# Patient Record
Sex: MALE | Race: WHITE | HISPANIC OR LATINO | Employment: UNEMPLOYED | URBAN - METROPOLITAN AREA
[De-identification: names, ages, dates, MRNs, and addresses within clinical notes are randomized per-mention and may not be internally consistent; named-entity substitution may affect disease eponyms.]

---

## 2017-01-25 ENCOUNTER — HOSPITAL ENCOUNTER (EMERGENCY)
Facility: HOSPITAL | Age: 27
Discharge: HOME | End: 2017-01-25
Attending: EMERGENCY MEDICINE

## 2017-01-25 VITALS
SYSTOLIC BLOOD PRESSURE: 116 MMHG | DIASTOLIC BLOOD PRESSURE: 76 MMHG | HEART RATE: 72 BPM | RESPIRATION RATE: 16 BRPM | BODY MASS INDEX: 27.62 KG/M2 | WEIGHT: 176 LBS | OXYGEN SATURATION: 100 % | TEMPERATURE: 98 F | HEIGHT: 67 IN

## 2017-01-25 DIAGNOSIS — R10.9 ABDOMINAL PAIN, UNSPECIFIED LOCATION: Primary | ICD-10-CM

## 2017-01-25 DIAGNOSIS — R10.11 ABDOMINAL PAIN, RIGHT UPPER QUADRANT: ICD-10-CM

## 2017-01-25 LAB
ALBUMIN SERPL BCP-MCNC: 4.1 G/DL
ALP SERPL-CCNC: 83 U/L
ALT SERPL W/O P-5'-P-CCNC: 161 U/L
ANION GAP SERPL CALC-SCNC: 10 MMOL/L
AST SERPL-CCNC: 97 U/L
BASOPHILS # BLD AUTO: 0.04 K/UL
BASOPHILS NFR BLD: 1 %
BILIRUB DIRECT SERPL-MCNC: 0.2 MG/DL
BILIRUB SERPL-MCNC: 0.7 MG/DL
BUN SERPL-MCNC: 14 MG/DL
CALCIUM SERPL-MCNC: 9.4 MG/DL
CHLORIDE SERPL-SCNC: 101 MMOL/L
CO2 SERPL-SCNC: 26 MMOL/L
CREAT SERPL-MCNC: 1 MG/DL
DIFFERENTIAL METHOD: ABNORMAL
EOSINOPHIL # BLD AUTO: 0.3 K/UL
EOSINOPHIL NFR BLD: 7.3 %
ERYTHROCYTE [DISTWIDTH] IN BLOOD BY AUTOMATED COUNT: 12.9 %
EST. GFR  (AFRICAN AMERICAN): >60 ML/MIN/1.73 M^2
EST. GFR  (NON AFRICAN AMERICAN): >60 ML/MIN/1.73 M^2
GLUCOSE SERPL-MCNC: 99 MG/DL
HCT VFR BLD AUTO: 47.2 %
HGB BLD-MCNC: 16.5 G/DL
LYMPHOCYTES # BLD AUTO: 0.9 K/UL
LYMPHOCYTES NFR BLD: 22.1 %
MCH RBC QN AUTO: 30.1 PG
MCHC RBC AUTO-ENTMCNC: 35 %
MCV RBC AUTO: 86 FL
MONOCYTES # BLD AUTO: 1.1 K/UL
MONOCYTES NFR BLD: 26.6 %
NEUTROPHILS # BLD AUTO: 1.7 K/UL
NEUTROPHILS NFR BLD: 42.5 %
PLATELET # BLD AUTO: 228 K/UL
PMV BLD AUTO: 9.8 FL
POTASSIUM SERPL-SCNC: 4 MMOL/L
PROT SERPL-MCNC: 8.1 G/DL
RBC # BLD AUTO: 5.49 M/UL
SODIUM SERPL-SCNC: 137 MMOL/L
WBC # BLD AUTO: 3.99 K/UL

## 2017-01-25 PROCEDURE — 99285 EMERGENCY DEPT VISIT HI MDM: CPT | Mod: ,,, | Performed by: PHYSICIAN ASSISTANT

## 2017-01-25 PROCEDURE — 85025 COMPLETE CBC W/AUTO DIFF WBC: CPT

## 2017-01-25 PROCEDURE — 25500020 PHARM REV CODE 255: Performed by: EMERGENCY MEDICINE

## 2017-01-25 PROCEDURE — 63600175 PHARM REV CODE 636 W HCPCS: Performed by: PHYSICIAN ASSISTANT

## 2017-01-25 PROCEDURE — 99284 EMERGENCY DEPT VISIT MOD MDM: CPT | Mod: 25

## 2017-01-25 PROCEDURE — 80076 HEPATIC FUNCTION PANEL: CPT

## 2017-01-25 PROCEDURE — 80048 BASIC METABOLIC PNL TOTAL CA: CPT

## 2017-01-25 PROCEDURE — 96361 HYDRATE IV INFUSION ADD-ON: CPT

## 2017-01-25 PROCEDURE — 96374 THER/PROPH/DIAG INJ IV PUSH: CPT

## 2017-01-25 PROCEDURE — 25000003 PHARM REV CODE 250: Performed by: PHYSICIAN ASSISTANT

## 2017-01-25 RX ORDER — NAPROXEN 500 MG/1
500 TABLET ORAL 2 TIMES DAILY WITH MEALS
Qty: 14 TABLET | Refills: 0 | Status: SHIPPED | OUTPATIENT
Start: 2017-01-25 | End: 2017-02-01

## 2017-01-25 RX ORDER — KETOROLAC TROMETHAMINE 30 MG/ML
10 INJECTION, SOLUTION INTRAMUSCULAR; INTRAVENOUS
Status: COMPLETED | OUTPATIENT
Start: 2017-01-25 | End: 2017-01-25

## 2017-01-25 RX ADMIN — SODIUM CHLORIDE 1000 ML: 0.9 INJECTION, SOLUTION INTRAVENOUS at 11:01

## 2017-01-25 RX ADMIN — KETOROLAC TROMETHAMINE 10 MG: 30 INJECTION, SOLUTION INTRAMUSCULAR at 10:01

## 2017-01-25 RX ADMIN — IOHEXOL 75 ML: 350 INJECTION, SOLUTION INTRAVENOUS at 11:01

## 2017-01-25 NOTE — ED AVS SNAPSHOT
[facility]  [address] [facility] [place] [address]               Cam Walton   2017  9:42 AM   ED    Descripción:  Male : 1990   Departamento:  [facility]           Perrin Cuidado fue coordinado por:     Provider Role From To    Parish Fall MD Attending Provider [id number] --    CRAIG Woodward Physician Assistant [id number] --      Razón de la giles     Abdominal Pain           Diagnósticos de Esta Visita        Comentarios    Abdominal pain, unspecified location    -  Primario       ED Disposition     ED Disposition Condition Comment    Discharge             Lista de tareas           Información de seguimiento     Realice un seguimiento con:   [facility]    Cómo:  Llamar    Cuándo:  2017    Por qué:  To discuss ER visit and schedule follow up appointment within 1 week    Información de contacto:    [address] [place] [address]  [phone]          Realice un seguimiento con:  [facility] general surgery clinic        Realice un seguimiento con:  [facility]    Cómo:  Aggie faud giles lo antes posible    Especialidad:  Surgery    Información de contacto:    [address] [facility] [id number] [phone]      Recetas para recoger        Disp Refills Start End    naproxen (NAPROSYN) 500 MG tablet 14 tablet 0 2017    Take 1 tablet (500 mg total) by mouth 2 (two) times daily with meals. - Oral      [facility] en Llamada     [facility] En [place]lamada Línea de Enfermeras - Asistencia   Enfermeras registradas de [facility] pueden ayudarle a reservar fuad giles, proveer educación para la mitra, asesoría clínica, y otros servicios de asesoramiento.   Llame para millie servicio gratuito a [phone].             Medicamentos           Mensaje sobre Medicamentos     Verificar los cambios y / o adiciones a perrin régimen de medicación son los mismos que discutir con perrin médico. Si  "cualquiera de estos cambios o adiciones son incorrectos, por favor notifique a burgess proveedor de atención médica.        EMPEZAR a dianna estos medicamentos NUEVOS        Refills    naproxen (NAPROSYN) 500 MG tablet 0    Sig: Take 1 tablet (500 mg total) by mouth 2 (two) times daily with meals.    Categoría: Print    Vía: Oral      These medications were administered today        Dose Freq    ketorolac injection 10 mg 10 mg ED 1 Time    Sig: Inject 10 mg into the vein ED 1 Time.    Categoría: Normal    Vía: Intravenous    Cofirmante de órdenes: Accepted by Parish Fall MD on 1/25/2017 11:31 AM    sodium chloride 0.9% bolus 1,000 mL 1,000 mL ED 1 Time    Sig: Inject 1,000 mLs into the vein ED 1 Time.    Categoría: Normal    Vía: Intravenous    Cofirmante de órdenes: Accepted by Parish Fall MD on 1/25/2017 11:31 AM    omnipaque 350 iohexol 75 mL 75 mL IMG once as needed    Sig: Inject 75 mLs into the vein ONCE PRN for contrast.    Categoría: Normal    Vía: Intravenous           Verifique que la siguiente lista de medicamentos es fuad representación exacta de los medicamentos que está tomando actualmente. Si no hay ningunos reportados, la lista puede estar en cuenca. Si no es correcta, por favor póngase en contacto con burgess proveedor de atención médica. Lleve esta lista con usted en sabrina de emergencia.           Medicamentos Actuales     naproxen (NAPROSYN) 500 MG tablet Take 1 tablet (500 mg total) by mouth 2 (two) times daily with meals.    ondansetron (ZOFRAN-ODT) 4 MG TbDL Take 1 tablet (4 mg total) by mouth every 6 (six) hours as needed.           Información de referencia clínica           Fern signos vitales aiden     PS Pulso Temperatura Resp [place]    116/76 (BP Location: Right arm, Patient Position: Sitting, BP Method: Automatic) 72 98.3 °F (36.8 °C) (Oral) 16 5' 7" (1.702 m) 79.8 kg (176 lb)    SpO2 BMI (IM)                100% 27.57 kg/m2          Yg abarca del:  1/25/2017        No Known " Allergies      Vacunas     Administradas en la fecha de la visita:  1/25/2017        None      ED Micro, Lab, POCT     Start Ordered       Status Ordering Provider    01/25/17 1040 01/25/17 1039  Hepatic function panel  Add-on      Completed     01/25/17 0949 01/25/17 0948  CBC auto differential  STAT      Final result     01/25/17 0949 01/25/17 0948  Basic metabolic panel  STAT      Final result     01/25/17 0949 01/25/17 0949  Hepatic function panel  Once      Final result       ED Imaging Orders     Start Ordered       Status Ordering Provider    01/25/17 1054 01/25/17 1053  CT Abdomen Pelvis With Contrast  1 time imaging      Final result         Instrucciones a shilpa de kirk         Dolor Abdominalcausa Desconocida [Male]  De acuerdo a burgess visita hoy, no es rosetta la causa exacta del dolor abdominal que usted tiene. Burgess condición no parece ser seria en millie momento. Sin embargo, las señales de fuad condición más seria pueden dianna más tiempo en aparecer. Aunque la evaluación fue tranquilizadora maria e, a veces en el curso de muchas enfermedades, exámenes y pruebas de laboratorio pueden parecer normales. Por esta razón, es importante que usted esté pendiente de nuevos síntomas o si burgess condición se empeora.  Causas  : Puede que no sea obvio lo que causó ten síntomas. Preste atención a las cosas que parecen hacer que ten síntomas empeoren o mejoren y consulte con burgess médico para burgess seguimiento.  Diagnóstico  : La evaluación del dolor abdominal en el departamento de emergencia sólo puede requerir un examen por el médico, o puede incluir análisis de darryl, orina o estudios de imagen, dependiendo de muchos factores. A veces, los exámenes y las pruebas pueden identificar fuad causa, mae en muchos casos, no se encuentra fuad causa rosetta. Más pruebas en las visitas de seguimiento pueden ayudar a sugerir un diagnóstico christine.  Cuidado En [place]:  · Descanse hasta que se sienta mejor.  · Trate de evitar cualquier medicamento (a menos  que se lo diga burgess médico), comidas, actividades, u otros factores que pueden ruth contribuido a los síntomas.  · Trate de comer alimentos que usted sabe que simpson tolerado johanna en el pasado. Algunas dietas pueden ser recomendados para algunas condiciones que causan dolor abdominal. Sin embargo, puesto que la causa de ten síntomas pueden no ser more, sobre burgess dieta más con burgess médico de cabecera o especialista para obtener más recomendaciones.  · [place] varias comidas pequeñas al día en lugar de 2 o 3 comidas grandes pueden ayudar.  · Observe con cuidado cualquier cosa que pueda hacer que ten síntomas empeoren o mejores. Preste mucha atención a los siguientes síntomas que pueden indicar empeoramiento de burgess condición.  Seguimiento Y Precauciones:  consulte con burgess doctor o en esta institución según las instrucciones dadas si ten síntomas no mejoran. En algunos casos, es posible que necesite más pruebas.  Comuníquese Con Burgess Médico O Busque Prontamente Atención Médica  si algo de lo siguiente ocurre:  · El dolor aumenta  · No puede dianna ten medicamentos debido al vómito excesivo  · Hinchazón del abdomen.  · Fiebre de 100.4°F (38°C) o más kirk, o saray le haya indicado burgess proveedor de atención médica  · Compa en el vómito o en la materia fecal (de color negruzco o rojizo oscuro).  · Debilidad, mareo o desmayo.  · Dolor en el pecho, brazo, espalda, claude o mandíbula  © 9463-2836 [employer]. [address] [place] [address], [place] [address]. Todos los derechos reservados. Esta información no pretende sustituir la atención médica profesional. Sólo burgess médico puede diagnosticar y tratar un problema de mitra.          Registrarse para [facility]     La activación de burgess cuenta [facility] es tan fácil saray 1-2-3!    1) Ir a [email or web address], seleccione Registrarse Ahora, meter el código de activación y burgess fecha de nacimiento, y seleccione Próximo.    [id number]  Expires: 3/11/2017  1:11 PM      2) Crear un nombre de  usuario y contraseña para usar cuando se visita [facility] en el futuro y selecciona fuad pregunta de seguridad en sabrina de que pierda burgess contraseña y seleccione Próximo.    3) Introduzca burgess dirección de correo electrónico y blaze marcie en Registrarse!    Información Adicional  Si tiene alguna pregunta, por favor, e-mail [email or web address] o llame al [phone] para hablar con nuestro personal. Recuerde, [facility] no debe ser usada para necesidades urgentes. En sabrina de emergencia médica, llame al [phone].         [facility] cumple con las leyes federales aplicables de derechos civiles y no discrimina por motivos de michael, color, origen nacional, edad, discapacidad, o sexo.        Language Assistance Services     ATTENTION: Language assistance services are available, free of charge. Please call [phone].      ATENCIÓN: Si habla español, tiene a burgess disposición servicios gratuitos de asistencia lingüística. Llame al [phone].     CHÚ Ý: N?u b?n nói Ti?ng Vi?t, có các d?ch v? h? tr? ngôn ng? mi?n phí dành cho b?n. G?i s? [phone].                      [facility]  [address] [facility] [address]               Cam Walton   2017  9:42 AM   ED    Description:  Male : 1990   Department:  [facility]           Your Care was Coordinated By:     Provider Role From To    Parish Fall MD Attending Provider 17 [id number] --    CRAIG Woodward Physician Assistant 17 [phone] --      Reason for Visit     Abdominal Pain           Diagnoses this Visit        Comments    Abdominal pain, unspecified location    -  Primary       ED Disposition     ED Disposition Condition Comment    Discharge             To Do List           Follow-up Information     Follow up with [facility]. Call in 1 day.    Why:  To discuss ER visit and schedule follow up appointment within 1 week    Contact information:    [address]  [facility] [address]  [phone]          Follow up with [facility] general surgery clinic.        Schedule an appointment as soon as possible for a visit with [facility].    Specialty:  Surgery    Contact information:    [address]  [facility] [address] [phone]       These Medications        Disp Refills Start End    naproxen (NAPROSYN) 500 MG tablet 14 tablet 0 1/25/2017 2/1/2017    Take 1 tablet (500 mg total) by mouth 2 (two) times daily with meals. - Oral      [facility]     [facility] Nurse Care Line - 24/7 Assistance  Registered nurses in the [facility] provide clinical advisement, health education, appointment booking, and other advisory services.  Call for this free service at [phone].             Medications           Message regarding Medications     Verify the changes and/or additions to your medication regime listed below are the same as discussed with your clinician today.  If any of these changes or additions are incorrect, please notify your healthcare provider.        START taking these NEW medications        Refills    naproxen (NAPROSYN) 500 MG tablet 0    Sig: Take 1 tablet (500 mg total) by mouth 2 (two) times daily with meals.    Class: Print    Route: Oral      These medications were administered today        Dose Freq    ketorolac injection 10 mg 10 mg ED 1 Time    Sig: Inject 10 mg into the vein ED 1 Time.    Class: Normal    Route: Intravenous    Cosign for Ordering: Accepted by Parish Fall MD on 1/25/2017 11:31 AM    sodium chloride 0.9% bolus 1,000 mL 1,000 mL ED 1 Time    Sig: Inject 1,000 mLs into the vein ED 1 Time.    Class: Normal    Route: Intravenous    Cosign for Ordering: Accepted by Parish Fall MD on 1/25/2017 11:31 AM    omnipaque 350 iohexol 75 mL 75 mL IMG once as needed    Sig: Inject 75 mLs into the vein ONCE PRN for contrast.    Class: Normal    Route: Intravenous           Verify  "that the below list of medications is an accurate representation of the medications you are currently taking.  If none reported, the list may be blank. If incorrect, please contact your healthcare provider. Carry this list with you in case of emergency.           Current Medications     naproxen (NAPROSYN) 500 MG tablet Take 1 tablet (500 mg total) by mouth 2 (two) times daily with meals.    ondansetron (ZOFRAN-ODT) 4 MG TbDL Take 1 tablet (4 mg total) by mouth every 6 (six) hours as needed.           Clinical Reference Information           Your Vitals Were     BP Pulse Temp Resp Height Weight    116/76 (BP Location: Right arm, Patient Position: Sitting, BP Method: Automatic) 72 98.3 °F (36.8 °C) (Oral) 16 5' 7" (1.702 m) 79.8 kg (176 lb)    SpO2 BMI                100% 27.57 kg/m2          Allergies as of 1/25/2017     No Known Allergies      Immunizations Administered on Date of Encounter - 1/25/2017     None      ED Micro, Lab, POCT     Start Ordered       Status Ordering Provider    01/25/17 1040 01/25/17 1039  Hepatic function panel  Add-on      Completed     01/25/17 0949 01/25/17 0948  CBC auto differential  STAT      Final result     01/25/17 0949 01/25/17 0948  Basic metabolic panel  STAT      Final result     01/25/17 0949 01/25/17 0949  Hepatic function panel  Once      Final result       ED Imaging Orders     Start Ordered       Status Ordering Provider    01/25/17 1054 01/25/17 1053  CT Abdomen Pelvis With Contrast  1 time imaging      Final result         Discharge Instructions         Dolor Abdominalcausa Desconocida [Male]  De acuerdo a burgess visita hoy, no es rosetta la causa exacta del dolor abdominal que usted tiene. Burgess condición no parece ser seria en millie momento. Sin embargo, las señales de fuad condición más seria pueden dianna más tiempo en aparecer. Aunque la evaluación fue tranquilizadora hoy, a veces en el curso de muchas enfermedades, exámenes y pruebas de laboratorio pueden parecer normales. " Por esta razón, es importante que usted esté pendiente de nuevos síntomas o si burgess condición se empeora.  Causas  : Puede que no sea obvio lo que causó ten síntomas. Preste atención a las cosas que parecen hacer que ten síntomas empeoren o mejoren y consulte con burgess médico para burgess seguimiento.  Diagnóstico  : La evaluación del dolor abdominal en el departamento de emergencia sólo puede requerir un examen por el médico, o puede incluir análisis de darryl, orina o estudios de imagen, dependiendo de muchos factores. A veces, los exámenes y las pruebas pueden identificar fuad causa, mae en muchos casos, no se encuentra fuad causa rosetta. Más pruebas en las visitas de seguimiento pueden ayudar a sugerir un diagnóstico christine.  Cuidado En [place]:  · Descanse hasta que se sienta mejor.  · Trate de evitar cualquier medicamento (a menos que se lo diga burgess médico), comidas, actividades, u otros factores que pueden ruth contribuido a los síntomas.  · Trate de comer alimentos que usted sabe que simpson tolerado johanna en el pasado. Algunas dietas pueden ser recomendados para algunas condiciones que causan dolor abdominal. Sin embargo, puesto que la causa de ten síntomas pueden no ser more, sobre brugess dieta más con burgess médico de cabecera o especialista para obtener más recomendaciones.  · [place] varias comidas pequeñas al día en lugar de 2 o 3 comidas grandes pueden ayudar.  · Observe con cuidado cualquier cosa que pueda hacer que ten síntomas empeoren o mejores. Preste mucha atención a los siguientes síntomas que pueden indicar empeoramiento de burgess condición.  Seguimiento Y Precauciones:  consulte con burgess doctor o en esta institución según las instrucciones dadas si ten síntomas no mejoran. En algunos casos, es posible que necesite más pruebas.  Comuníquese Con Burgess Médico O Busque Prontamente Atención Médica  si algo de lo siguiente ocurre:  · El dolor aumenta  · No puede dianna ten medicamentos debido al vómito excesivo  · Hinchazón del  abdomen.  · Fiebre de 100.4°F (38°C) o más kirk, o saray le haya indicado burgess proveedor de atención médica  · Compa en el vómito o en la materia fecal (de color negruzco o rojizo oscuro).  · Debilidad, mareo o desmayo.  · Dolor en el pecho, brazo, espalda, claude o mandíbula  © 7508-5529 [employer]. [address] [place] [address]. Todos los derechos reservados. Esta información no pretende sustituir la atención médica profesional. Sólo burgess médico puede diagnosticar y tratar un problema de mitra.          [facility] Sign-Up     Activating your [facility] account is as easy as 1-2-3!     1) Visit [email or web address], select Sign Up Now, enter this activation code and your date of birth, then select Next.  [email or web address]  Expires: 3/11/2017  1:11 PM      2) Create a username and password to use when you visit [facility] in the future and select a security question in case you lose your password and select Next.    3) Enter your e-mail address and click Sign Up!    Additional Information  If you have questions, please e-mail [email or web address] or call [phone] to talk to our [facility] staff. Remember, [facility] is NOT to be used for urgent needs. For medical emergencies, dial 911.          [facility] complies with applicable Federal civil rights laws and does not discriminate on the basis of race, color, national origin, age, disability, or sex.        Language Assistance Services     ATTENTION: Language assistance services are available, free of charge. Please call [phone].      ATENCIÓN: Si habla español, tiene a burgess disposición servicios gratuitos de asistencia lingüística. Llame al [phone].     CHÚ Ý: N?u b?n nói Ti?ng Vi?t, có các d?ch v? h? tr? ngôn ng? mi?n phí dành cho b?n. G?i s? [phone].

## 2017-01-25 NOTE — DISCHARGE INSTRUCTIONS
Dolor Abdominalcausa Desconocida [Male]  De acuerdo a burgess visita [place], no es rosetta la causa exacta del dolor abdominal que usted tiene. Burgess condición no parece ser seria en millie momento. Sin embargo, las señales de fuad condición más seria pueden dianna más tiempo en aparecer. Aunque la evaluación fue tranquilizadora maria e, a veces en el curso de muchas enfermedades, exámenes y pruebas de laboratorio pueden parecer normales. Por esta razón, es importante que usted esté pendiente de nuevos síntomas o si burgess condición se empeora.  Causas  : Puede que no sea obvio lo que causó ten síntomas. Preste atención a las cosas que parecen hacer que ten síntomas empeoren o mejoren y consulte con burgess médico para burgess seguimiento.  Diagnóstico  : La evaluación del dolor abdominal en el departamento de emergencia sólo puede requerir un examen por el médico, o puede incluir análisis de darryl, orina o estudios de imagen, dependiendo de muchos factores. A veces, los exámenes y las pruebas pueden identificar fuad causa, mae en muchos casos, no se encuentra fuad causa rosetta. Más pruebas en las visitas de seguimiento pueden ayudar a sugerir un diagnóstico christine.  Cuidado En [place]:  · Descanse hasta que se sienta mejor.  · Trate de evitar cualquier medicamento (a menos que se lo diga burgess médico), comidas, actividades, u otros factores que pueden ruth contribuido a los síntomas.  · Trate de comer alimentos que usted sabe que simpson tolerado johanna en el pasado. Algunas dietas pueden ser recomendados para algunas condiciones que causan dolor abdominal. Sin embargo, puesto que la causa de ten síntomas pueden no ser more, sobre burgess dieta más con burgess médico de cabecera o especialista para obtener más recomendaciones.  · [place] varias comidas pequeñas al día en lugar de 2 o 3 comidas grandes pueden ayudar.  · Observe con cuidado cualquier cosa que pueda hacer que ten síntomas empeoren o mejores. Preste mucha atención a los siguientes síntomas que pueden indicar  empeoramiento de burgess condición.  Seguimiento Y Precauciones:  consulte con burgess doctor o en esta institución según las instrucciones dadas si ten síntomas no mejoran. En algunos casos, es posible que necesite más pruebas.  Comuníquese Con Burgess Médico O Busque Prontamente Atención Médica  si algo de lo siguiente ocurre:  · El dolor aumenta  · No puede dianna ten medicamentos debido al vómito excesivo  · Hinchazón del abdomen.  · Fiebre de 100.4°F (38°C) o más kirk, o saray le haya indicado burgess proveedor de atención médica  · Compa en el vómito o en la materia fecal (de color negruzco o rojizo oscuro).  · Debilidad, mareo o desmayo.  · Dolor en el pecho, pedro, madi, claude o bette  © 2739-4592 [employer]. [address] [place] [address], [place] [address]. Todos los derechos reservados. Esta información no pretende sustituir la atención médica profesional. Sólo burgess médico puede diagnosticar y tratar un problema de mitra.

## 2017-01-25 NOTE — ED NOTES
Patient came in with a c/o RUQ pain, patient states that the pain originally started in the RLQ on Sunday. Patient states that the pain is radiating upwards.

## 2017-01-25 NOTE — ED NOTES
Patient identifiers verified and correct for Cam Walton.    LOC: The patient is awake, alert and aware of environment with an appropriate affect, the patient is oriented x 3 and speaking appropriately.  APPEARANCE: Patient resting comfortably and in no acute distress, patient is clean and well groomed, patient's clothing is properly fastened.  SKIN: The skin is warm and dry, color consistent with ethnicity, patient has normal skin turgor and moist mucus membranes, skin intact, no breakdown or bruising noted.  MUSCULOSKELETAL: Patient moving all extremities spontaneously, no obvious swelling or deformities noted.  RESPIRATORY: Airway is open and patent, respirations are spontaneous, patient has a normal effort and rate, no accessory muscle use noted, clear bilateral breath sounds noted through out the chest.  ABDOMEN: Soft and  tender to palpation (RUQ), no distention noted, normoactive bowel sounds present in all four quadrants.

## 2017-01-25 NOTE — ED PROVIDER NOTES
Encounter Date: 1/25/2017    SCRIBE #1 NOTE: I, Regla Egan, am scribing for, and in the presence of,  Dr. Fall. I have scribed the following portions of the note - the APC attestation.       History     Chief Complaint   Patient presents with    Abdominal Pain     Pt c/o RLQ abdominal pain. Pt denies nausea and vomiting. Pt c/o low grade fever. Pt states in 2015 he had fluid drained from his appendix.      Review of patient's allergies indicates:  No Known Allergies  HPI Comments: 26-year-old male presents to the ER with chief complaint of right sided abdominal pain ×4 days.  The patient has a history of acute appendicitis with perforation and abscess in 12/2015.   He had an IR drain placed and was hospitalized and given IV antibiotics at that time.  The patient returned to our ER November 2016 with a second episode of abdominal pain.  The patient was found to have an early appendicitis on CT, he was treated with Cipro and Flagyl and discharged home.    The patient's pain returned in the right lower quadrant 4 days ago.  He says his pain is now in the right upper quadrant today.  He describes intermittent burning pain.  His pain was severe prior to arrival but is currently rated 4/10.  He has not taken any medications for pain.  He denies known alleviating or exacerbating factors.  He reports subjective fever with body aches when his pain began 4 days ago.  He denies nausea, vomiting, diarrhea, or additional complaints at this time.    Past Medical History   Diagnosis Date    Acute appendicitis with perforation and peritoneal abscess 2015     IR drain, abx     Past Medical History Pertinent Negatives   Diagnosis Date Noted    Diabetes mellitus 11/29/2016    Hypertension 11/29/2016     No past surgical history on file.  History reviewed. No pertinent family history.  Social History   Substance Use Topics    Smoking status: Never Smoker    Smokeless tobacco: None    Alcohol use Yes     Review of Systems    Constitutional: Positive for fever (subjective). Negative for chills.   HENT: Negative for sore throat.    Respiratory: Negative for shortness of breath.    Cardiovascular: Negative for chest pain.   Gastrointestinal: Negative for abdominal pain, nausea and vomiting.   Genitourinary: Negative for dysuria.   Musculoskeletal: Positive for myalgias. Negative for back pain and neck pain.   Skin: Negative for rash.   Neurological: Negative for dizziness, syncope, weakness and light-headedness.   Hematological: Does not bruise/bleed easily.   Psychiatric/Behavioral: Negative for confusion.       Physical Exam   Initial Vitals   BP Pulse Resp Temp SpO2   01/25/17 0930 01/25/17 0930 01/25/17 0930 01/25/17 0930 01/25/17 1209   137/77 82 16 98.4 °F (36.9 °C) 95 %     Physical Exam    Constitutional: He appears well-developed and well-nourished.   HENT:   Head: Atraumatic.   Mouth/Throat: Oropharynx is clear and moist.   Eyes: Conjunctivae and EOM are normal. Pupils are equal, round, and reactive to light.   Neck: Normal range of motion. Neck supple.   Cardiovascular: Normal rate and regular rhythm.   Pulmonary/Chest: Breath sounds normal. No respiratory distress. He has no wheezes. He has no rhonchi. He has no rales.   Abdominal: Soft. Bowel sounds are normal. He exhibits no distension. There is tenderness in the right upper quadrant. There is no rebound and no guarding.   Neurological: He is alert and oriented to person, place, and time.   Skin: No rash noted.   Psychiatric: He has a normal mood and affect.         ED Course   Procedures  Labs Reviewed   CBC W/ AUTO DIFFERENTIAL - Abnormal; Notable for the following:        Result Value    Gran # 1.7 (*)     Lymph # 0.9 (*)     Mono # 1.1 (*)     Mono% 26.6 (*)     All other components within normal limits   HEPATIC FUNCTION PANEL - Abnormal; Notable for the following:     AST 97 (*)      (*)     All other components within normal limits    Narrative:     add on per  dr. darrius kirk md; order number [id number] ([id number])   11:21  01/25/2017    BASIC METABOLIC PANEL   HEPATIC FUNCTION PANEL             Medical Decision Making:   History:   Old Medical Records: I decided to obtain old medical records.  Independently Interpreted Test(s):   I have ordered and independently interpreted X-rays - see prior notes.  Clinical Tests:   Lab Tests: Ordered and Reviewed  Radiological Study: Ordered and Reviewed       APC / Resident Notes:   Patient presents to the ER for evaluation of right-sided abdominal pain.  His pain initially began in the RLQ but is in the right upper quadrant today.  He has associated subjective fevers and a history of perforated appendicitis.  Last episode of appendicitis was November 2016 treated with antibiotics.  The patient did follow-up in surgery clinic and was told that they would not require appendectomy if he did not have another episode of appendicitis in 5 years.      I will perform labs and CT of the abdomen, give Toradol for pain control, and reassess.  Patient feels improved on reassessment.  He has slight tenderness in the right upper quadrant.  Patient has slight elevation of LFTs consistent with previous labs.  No obstructive pattern and his presentation is not concerning for acute cholecystitis.  He has no leukocytosis, signs of significant dehydration or electrolyte abnormalities.     CT shows enlarged fatty liver, with no other acute abnormalities.  They note a previous appendicolith has been resected and there is no inflammatory process.    The cause of the patients pain is not clear today, but we feel he is stable for discharge.      Patient is informed of enlarged fatty liver and elevation of LFTs.  He is instructed on close follow-up with a primary care physician as well as general surgery clinic.      I prescribed naproxen as needed for pain control.  He is given strict return precautions.I discussed the care of this patient with my  supervising MD. Ya Attestation:   Grahamibe #1: I performed the above scribed service and the documentation accurately describes the services I performed. I attest to the accuracy of the note.    Attending Attestation:     Physician Attestation Statement for NP/PA:   I discussed this assessment and plan of this patient with the NP/PA, but I did not personally examine the patient. The face to face encounter was performed by the NP/PA.        Physician Attestation for Scribe:  Physician Attestation Statement for Scribe #1: I, Dr. Fall, reviewed documentation, as scribed by Regla Egan in my presence, and it is both accurate and complete.                 ED Course     Clinical Impression:   The encounter diagnosis was Abdominal pain, unspecified location.          CRAIG Woodward  01/25/17 2492

## 2018-12-11 ENCOUNTER — HOSPITAL ENCOUNTER (EMERGENCY)
Facility: HOSPITAL | Age: 28
Discharge: HOME | End: 2018-12-11
Attending: EMERGENCY MEDICINE

## 2018-12-11 VITALS
WEIGHT: 184 LBS | RESPIRATION RATE: 16 BRPM | HEART RATE: 80 BPM | TEMPERATURE: 98 F | SYSTOLIC BLOOD PRESSURE: 126 MMHG | DIASTOLIC BLOOD PRESSURE: 80 MMHG | BODY MASS INDEX: 27.89 KG/M2 | HEIGHT: 68 IN | OXYGEN SATURATION: 97 %

## 2018-12-11 DIAGNOSIS — K59.00 CONSTIPATION, UNSPECIFIED CONSTIPATION TYPE: ICD-10-CM

## 2018-12-11 DIAGNOSIS — R10.31 RLQ ABDOMINAL PAIN: Primary | ICD-10-CM

## 2018-12-11 LAB
ALBUMIN SERPL BCP-MCNC: 4.2 G/DL
ALP SERPL-CCNC: 99 U/L
ALT SERPL W/O P-5'-P-CCNC: 140 U/L
AMORPH CRY URNS QL MICRO: NORMAL
ANION GAP SERPL CALC-SCNC: 9 MMOL/L
AST SERPL-CCNC: 90 U/L
BASOPHILS # BLD AUTO: 0.09 K/UL
BASOPHILS NFR BLD: 1.5 %
BILIRUB SERPL-MCNC: 0.4 MG/DL
BILIRUB UR QL STRIP: NEGATIVE
BUN SERPL-MCNC: 14 MG/DL
CALCIUM SERPL-MCNC: 9.4 MG/DL
CHLORIDE SERPL-SCNC: 107 MMOL/L
CLARITY UR: ABNORMAL
CO2 SERPL-SCNC: 26 MMOL/L
COLOR UR: YELLOW
CREAT SERPL-MCNC: 1.1 MG/DL
DIFFERENTIAL METHOD: ABNORMAL
EOSINOPHIL # BLD AUTO: 1 K/UL
EOSINOPHIL NFR BLD: 16.6 %
ERYTHROCYTE [DISTWIDTH] IN BLOOD BY AUTOMATED COUNT: 12.7 %
EST. GFR  (AFRICAN AMERICAN): >60 ML/MIN/1.73 M^2
EST. GFR  (NON AFRICAN AMERICAN): >60 ML/MIN/1.73 M^2
GLUCOSE SERPL-MCNC: 106 MG/DL
GLUCOSE UR QL STRIP: NEGATIVE
HCT VFR BLD AUTO: 43.8 %
HGB BLD-MCNC: 15.6 G/DL
HGB UR QL STRIP: NEGATIVE
KETONES UR QL STRIP: NEGATIVE
LEUKOCYTE ESTERASE UR QL STRIP: NEGATIVE
LIPASE SERPL-CCNC: 23 U/L
LYMPHOCYTES # BLD AUTO: 2.4 K/UL
LYMPHOCYTES NFR BLD: 39.5 %
MCH RBC QN AUTO: 30.6 PG
MCHC RBC AUTO-ENTMCNC: 35.6 G/DL
MCV RBC AUTO: 86 FL
MICROSCOPIC COMMENT: NORMAL
MONOCYTES # BLD AUTO: 0.5 K/UL
MONOCYTES NFR BLD: 7.8 %
NEUTROPHILS # BLD AUTO: 2.1 K/UL
NEUTROPHILS NFR BLD: 34.6 %
NITRITE UR QL STRIP: NEGATIVE
PH UR STRIP: 8 [PH] (ref 5–8)
PLATELET # BLD AUTO: 219 K/UL
PMV BLD AUTO: 10.2 FL
POTASSIUM SERPL-SCNC: 3.2 MMOL/L
PROT SERPL-MCNC: 7.6 G/DL
PROT UR QL STRIP: NEGATIVE
RBC # BLD AUTO: 5.09 M/UL
RBC #/AREA URNS HPF: 0 /HPF (ref 0–4)
SODIUM SERPL-SCNC: 142 MMOL/L
SP GR UR STRIP: 1.01 (ref 1–1.03)
SQUAMOUS #/AREA URNS HPF: NORMAL /HPF
URN SPEC COLLECT METH UR: ABNORMAL
UROBILINOGEN UR STRIP-ACNC: NEGATIVE EU/DL
WBC # BLD AUTO: 6.03 K/UL
WBC #/AREA URNS HPF: 0 /HPF (ref 0–5)

## 2018-12-11 PROCEDURE — 81000 URINALYSIS NONAUTO W/SCOPE: CPT

## 2018-12-11 PROCEDURE — 25500020 PHARM REV CODE 255: Performed by: EMERGENCY MEDICINE

## 2018-12-11 PROCEDURE — 85025 COMPLETE CBC W/AUTO DIFF WBC: CPT

## 2018-12-11 PROCEDURE — 87491 CHLMYD TRACH DNA AMP PROBE: CPT

## 2018-12-11 PROCEDURE — 83690 ASSAY OF LIPASE: CPT

## 2018-12-11 PROCEDURE — 80053 COMPREHEN METABOLIC PANEL: CPT

## 2018-12-11 PROCEDURE — 25000003 PHARM REV CODE 250: Performed by: NURSE PRACTITIONER

## 2018-12-11 PROCEDURE — 99285 EMERGENCY DEPT VISIT HI MDM: CPT | Mod: 25

## 2018-12-11 RX ORDER — POLYETHYLENE GLYCOL 3350 17 G/17G
17 POWDER, FOR SOLUTION ORAL DAILY
Refills: 0 | COMMUNITY
Start: 2018-12-11

## 2018-12-11 RX ORDER — SYRING-NEEDL,DISP,INSUL,0.3 ML 29 G X1/2"
296 SYRINGE, EMPTY DISPOSABLE MISCELLANEOUS ONCE
Refills: 0 | COMMUNITY
Start: 2018-12-11 | End: 2018-12-11

## 2018-12-11 RX ADMIN — IOHEXOL 75 ML: 350 INJECTION, SOLUTION INTRAVENOUS at 10:12

## 2018-12-11 RX ADMIN — SODIUM CHLORIDE 1000 ML: 0.9 INJECTION, SOLUTION INTRAVENOUS at 09:12

## 2018-12-12 NOTE — ED PROVIDER NOTES
Encounter Date: 12/11/2018     This is a 28 y.o. male complaining of subjective fever, vomiting, abdominal pain. Also reports generalized body aches. Denies cough.    I have evaluated and conducted a medical screening exam with initial orders entered, if indicated, to expedite care. The patient will be placed in a treatment area when one is available. Care will be transferred to an alternate provider for a full assessment including but not limited to: history, physical exam, additional orders, and final disposition.    Dominick Carrillo NP      SCRIBE #1 NOTE: IMarilu am scribing for, and in the presence of,  EVITA Montemayor. I have scribed the following portions of the note - Other sections scribed: HPI and ROS.       History     Chief Complaint   Patient presents with    Generalized Body Aches     Pt reports body aches all over and yesterday he vomited a couple times.     CC: Abdominal Pain    28 y.o. Male, with a medical history of acute appendicitis with perforation and peritoneal abscess (2015), presents to the ED c/o acute right lower quadrant abdominal pain for the past 2x days. Pt reports experiencing a cramping sensation to the RLQ, noting that pain is intermittent and severe (8/10). He adds that he has also been experiencing generalized body aches and a subjective fever (began yesterday; resolved today). Pt notes that he has been evaluated at this ED for appendix issues in the past. He additionally c/o an itchy penile rash that began 1x month ago. Pt denies penile discharge and dysuria. No other associated symptoms. No alleviating factors.      The history is provided by the patient. A  was used (Nurse translating for pt).     Review of patient's allergies indicates:  No Known Allergies  Past Medical History:   Diagnosis Date    Acute appendicitis with perforation and peritoneal abscess 2015    IR drain, abx     History reviewed. No pertinent surgical history.  History  reviewed. No pertinent family history.  Social History     Tobacco Use    Smoking status: Never Smoker    Smokeless tobacco: Never Used   Substance Use Topics    Alcohol use: Yes     Comment: socially    Drug use: No     Review of Systems   Constitutional: Positive for fever (subjective). Negative for appetite change and chills.   HENT: Negative for sore throat.    Respiratory: Negative for shortness of breath.    Cardiovascular: Negative for chest pain.   Gastrointestinal: Positive for abdominal pain (cramping; RLQ). Negative for constipation (Last bowel movement today at 5pm), nausea and vomiting.   Genitourinary: Negative for discharge and dysuria.   Musculoskeletal: Positive for myalgias (generalized). Negative for back pain.   Skin: Positive for rash (to the penis).   Neurological: Negative for weakness.   All other systems reviewed and are negative.    Physical Exam     Initial Vitals [12/11/18 2035]   BP Pulse Resp Temp SpO2   128/77 73 18 98.2 °F (36.8 °C) 98 %      MAP       --         Physical Exam    Nursing note and vitals reviewed.  Constitutional: He appears well-developed and well-nourished.   HENT:   Head: Normocephalic and atraumatic.   Eyes: Conjunctivae and EOM are normal. Pupils are equal, round, and reactive to light.   Cardiovascular: Normal rate, regular rhythm, normal heart sounds and intact distal pulses. Exam reveals no gallop and no friction rub.    No murmur heard.  Pulmonary/Chest: Breath sounds normal. No respiratory distress. He has no wheezes. He has no rhonchi. He has no rales. He exhibits no tenderness.   Abdominal: Soft. Normal appearance and bowel sounds are normal. He exhibits no distension and no mass. There is tenderness in the right lower quadrant. There is no rigidity, no rebound, no guarding, no CVA tenderness, no tenderness at McBurney's point and negative Minor's sign.   Musculoskeletal: Normal range of motion. He exhibits no edema or tenderness.   Neurological: He is  alert and oriented to person, place, and time. He has normal strength. GCS score is 15. GCS eye subscore is 4. GCS verbal subscore is 5. GCS motor subscore is 6.   Skin: No rash and no abscess noted. No erythema.   Multiple skin lesions noted to shaft of penis consistent with condyloma- no rash or wound noted        ED Course   Procedures  Labs Reviewed   CBC W/ AUTO DIFFERENTIAL - Abnormal; Notable for the following components:       Result Value    Eos # 1.0 (*)     Gran% 34.6 (*)     Eosinophil% 16.6 (*)     All other components within normal limits   C. TRACHOMATIS/N. GONORRHOEAE BY AMP DNA   COMPREHENSIVE METABOLIC PANEL   LIPASE   URINALYSIS, REFLEX TO URINE CULTURE   URINALYSIS MICROSCOPIC          Imaging Results          CT Abdomen Pelvis With Contrast (In process)                  Medical Decision Making:   History:   Old Medical Records: I decided to obtain old medical records.  Old Records Summarized: other records.       <> Summary of Records: Patient seen at [facility] ED in 2015 for appendicitis with perforation.  Patient was admitted with IR drainage of abdominal abscess and IV antibiotics.    Two thousand sixteen the patient had recurrence of appendicitis and was not admitted and discharged from the emergency room with close follow-up with General surgery.  Patient did not have his appendix removed.  Initial Assessment:   28-year-old male which presents with right lower quadrant abdominal pain that began 2 days ago along with fever.  Patient denies nausea vomiting. He patient is also concerned about a rash to his penis.  Low suspicion for appendicitis on exam but with history and isolated right lower quadrant pain patient will be worked up for appendicitis.  Urinalysis, lab work and CT ordered.  Differential Diagnosis:   Appendicitis, STD, constipation, colitis, diverticulitis  Clinical Tests:   Lab Tests: Ordered  Radiological Study: Ordered  ED Management:  Patient examined and there is low concern  for appendicitis.  Patient stated he had fever but has no fever here and has not taken any medication to alleviate the fever.  Lab were within normal limits and CT does not show appendicitis.  There is a large amount of stool on CT abdomen.  Patient advised to take MiraLax and Mag citrate to help with constipation.  All discharge instructions were given in Bulgarian.  Patient left emergency room prior to provider being able to discuss discharge information.            Scribe Attestation:   Scribe #1: I performed the above scribed service and the documentation accurately describes the services I performed. I attest to the accuracy of the note.    Attending Attestation:           Physician Attestation for Scribe:  Physician Attestation Statement for Scribe #1: I, EVITA Montemayor, reviewed documentation, as scribed by Marilu De La Rosa in my presence, and it is both accurate and complete.                 ED Course as of Dec 12 0029   Tue Dec 11, 2018   2116 BP: 128/77 [AT]   2116 Temp: 98.2 °F (36.8 °C) [AT]   2116 Temp src: Oral [AT]   2116 Pulse: 73 [AT]   2116 Resp: 18 [AT]   2116 SpO2: 98 % [AT]   2147 WBC: 6.03 [AT]   2201 Appearance, UA: (!) Cloudy [AT]   2202 Leukocytes, UA: Negative [AT]   2212 Lipase: 23 [AT]   2212 Potassium: (!) 3.2 [AT]   2212 AST: (!) 90 [AT]   2212 ALT: (!) 140 [AT]      ED Course User Index  [AT] EVITA Alberts     Clinical Impression:   There were no encounter diagnoses.                             EVITA Alberts  12/12/18 0034

## 2018-12-12 NOTE — ED TRIAGE NOTES
+ pt. Reports abd pain to the right lower part of his abd. Pt. States pain has been present for 2 days. Pt. Reports fever on yesterday however non-today. Pt. Denies any nausea/vomiting or BM problems. Pt. state abd pain is a cramping sensation. Pain is 8/10.  + for genital rash/itching. Pt. Reports itching and rash to pubic area. Pt. States he has a wart to the penis of his head.

## 2018-12-13 LAB
C TRACH DNA SPEC QL NAA+PROBE: NOT DETECTED
N GONORRHOEA DNA SPEC QL NAA+PROBE: NOT DETECTED